# Patient Record
(demographics unavailable — no encounter records)

---

## 2017-04-13 NOTE — PRO
BRONCHOSCOPY REPORT:

 

DATE OF PROCEDURE:  04/12/17

 

PROCEDURE PERFORMED:  Bronchoscopy with endobronchial ultrasound-guided fine-
needle aspiration of right hilar mass and mediastinal nodes.

 

PREPROCEDURAL DIAGNOSIS:  Lung mass, mediastinal adenopathy, rule out 
malignancy.

 

ANESTHESIA:  General anesthesia.

 

ANESTHESIOLOGIST:  Salina Lakhani MD

 

DESCRIPTION OF PROCEDURE:  Informed consent was obtained from the patient prior 
to the procedure after all the risks and benefits were thoroughly explained.  
The patient was intubated with size 8.0 endotracheal tube prior to the 
procedure. Appropriate time-out was performed and agreed on by operating room 
staff.  The patient was placed supine on operating room table.  Servando hugger and 
Venodynes were placed.  Flexible Olympus bronchoscope was inserted through ET 
tube for airway inspection.  The patient noted to have thick white secretions 
bilaterally. Secretions were suctioned out.  Sample was sent to lab for 
microbiological examination.  No endobronchial lesions were noted.  
Bronchoscope was then withdrawn and EBUS bronchoscope was inserted.  The right 
hilar mass was accessed with 6 passes.  Atypical cells were seen in one of the 
pass.  Adequate sample was ascertained by rapid on-site evaluation.  Station R4 
lymph node was accessed with 5 passes.  Rapid on-site evaluation revealed 
adequate lymphatic tissue and evidence of malignant cells in 2 out of those 5 
passes.  Bronchoscope was then advanced and station 7 lymph node was sampled 
with 4 passes.  Rapid on-site evaluation revealed adequate lymphatic tissue and 
no evidence of malignant cells.  Station L4 was then accessed with 2 passes.  
Rapid on-site evaluation revealed lymphatic tissue on 2 passes with no atypical 
cells.  The patient did not have significantly enlarged left-sided nodes and 
therefore they were not biopsied.  Minimum bleeding occurred during the 
procedure.  The patient was extubated and seen in recovery in optimal 
condition.  Bronchoalveolar lavage was obtained from right upper lobe and was 
sent to the lab for microbiological examination.

 

 [id number]/[facility] #: [id number]

MTDD

## 2017-05-16 NOTE — RAD
Indication: Status post Mediport insertion.



Single frontal view of the chest performed at 1655 hours was reviewed.



Comparison is made with previous exam dated May 16, 2017.



Right-sided central catheter is in place. No pneumothorax is noted. The tip is noted in

the superior vena cava. No definite pleural fluid, pneumonia or pneumothorax is noted.



IMPRESSION: CENTRAL CATHETER IN PLACE.

## 2017-05-16 NOTE — RAD
CPT II Codes: 6045F



INDICATION: Central line placement



Fluoroscopic services provided for referring physician. 57.4 seconds of fluoroscopy time

was used. There is placement of a right-sided PowerPort with the tip in the superior vena

cava.



IMPRESSION: FLUOROSCOPIC SERVICES PROVIDED FOR REFERRING PHYSICIAN FOR CENTRAL CATHETER

PLACEMENT.

## 2017-05-17 NOTE — OP
DATE OF OPERATION:  05/16/17 - [facility]

 

DATE OF BIRTH:  05/11/64

 

SURGEON:  Chucho Queen MD

 

ASSISTANT:  None.

 

ANESTHESIOLOGIST:  Dr. Oquendo.

 

ANESTHESIA:  Local MAC.

 

PRE-OPERATIVE DIAGNOSIS:  Right lung carcinoma.

 

POST-OPERATIVE DIAGNOSIS:  Right lung carcinoma.

 

OPERATIVE PROCEDURE:  Removal of left subclavian central venous catheter with 
subcutaneous port and placement of a right subclavian PowerPort.

 

ESTIMATED BLOOD LOSS:  10 mL.

 

IV FLUIDS:  Crystalloids.

 

SPECIMENS:  None.

 

DRAINS:  None.

 

COMPLICATIONS:  None.

 

COUNTS:  The instruments, needle, and sponge counts were correct.

 

DESCRIPTION OF PROCEDURE:  The patient was brought to the operating room and 
placed on the table supine.  Sequential compression devices were placed on both 
lower extremities.  The patient was administered intravenous sedation.  The 
chest and neck were prepped and draped in the usual sterile fashion and time-
out was performed.

 

Local anesthetic was infiltrated into the soft tissue and skin on the left 
upper chest.  The area of the palpable subcutaneous port and through the area 
of the previous scar, an incision was created, dividing the subcutaneous 
tissues with cautery and identifying the catheter and the port, which was 
elevated.  The catheter was divided on the port side, the port was removed from 
the subcutaneous tissues with cautery.

 

The catheter was threaded with a guidewire and then the catheter was bagged 
out. The guidewire was confirmed to be in the superior vena cava under 
fluoroscopic imaging.  Attempts were made to pass a peel-away sheath and dilator
; however, there appeared to be significant fibrosis along the tract and that 
was not possible.  It was decided to then attempt the left cephalic vein cut 
down.  The incision was extended further towards the lateral aspect in the 
dissection of the deltopectoral triangle revealed a cephalic vein, which was 
isolated with 3-0 silks, ligated distally and then a venotomy created and the 8-
Norwegian PowerPort catheter was advanced into the area of the subclavian vein; 
however, upon attempts to advance the catheter into the superior vena cava, the 
catheter preferentially went into the jugular vein and then into the 
contralateral subclavian vein and despite attempts to manipulate the catheter 
and advance it into the superior vena cava, this was not possible.  Lastly, an 
attempt was made to place the left internal jugular catheter and under 
ultrasound guidance, the left internal jugular vein was cannulated; however, 
guidewire was not able to be passed and ultimately it was decided to abort any 
further attempts from the left side in favor of placement of the right-sided 
port.  The cephalic vein was ligated with 3-0 silk.  Hemostasis was assured in 
the wound and the wound was closed with 3-0 Polysorb for the subcutaneous 
tissues and 4- 0 Monocryl for the skin in the running subcuticular fashion and 
Steri-Strips were applied.  The patient was then re-prepped and draped and time-
out was re-performed for a right subclavian vein PowerPort placement.  After 
infiltrating with local anesthetic, the vein was cannulated on second pass with 
the needle and the guidewire was placed in the superior vena cava without 
difficulty.  Additional anesthetic was infiltrated to create the subcutaneous 
port inferior to the insertion site and the pocket was created with cautery.  A 
counterincision was made at the guidewire insertion site and then the 8-Norwegian 
PowerPort catheter was back tunneled to the pocket.  The peel-away sheath and 
dilator were advanced over the guidewire into the superior vena cava under 
fluoroscopic guidance, then the catheter was advanced into the superior vena 
cava under fluoroscopic guidance.  The catheter was cut through the appropriate 
length, connected to the port, which was placed in the subcutaneous pocket and 
secured with a single 2-0 Surgipro.  The port was accessed, it valdez and flushed 
easily.  The wounds were then closed with 3-0 Polysorb for the subcutaneous 
tissues, 4-0 Monocryl for the skin, and Steri-Strips applied.  The port was 
accessed and flushed with heparinized saline.  Tegaderm dressing was then 
placed over the site.  The patient tolerated the procedure well. He was 
awakened and transferred to the recovery room in stable condition.



CC:  Dr. Wilmer Pompa; Alissa Sánchez MD; Chucho Loya MD *

 

 [id number] #: [id number]

[facility] CenterSATHYA